# Patient Record
Sex: FEMALE | Race: WHITE | Employment: FULL TIME | URBAN - NONMETROPOLITAN AREA
[De-identification: names, ages, dates, MRNs, and addresses within clinical notes are randomized per-mention and may not be internally consistent; named-entity substitution may affect disease eponyms.]

---

## 2017-04-06 DIAGNOSIS — J02.9 PHARYNGITIS, UNSPECIFIED ETIOLOGY: Primary | ICD-10-CM

## 2017-04-06 RX ORDER — AZITHROMYCIN 250 MG/1
TABLET, FILM COATED ORAL
Qty: 1 PACKET | Refills: 0 | Status: SHIPPED | OUTPATIENT
Start: 2017-04-06 | End: 2017-05-09 | Stop reason: ALTCHOICE

## 2017-05-09 ENCOUNTER — OFFICE VISIT (OUTPATIENT)
Dept: FAMILY MEDICINE CLINIC | Age: 32
End: 2017-05-09

## 2017-05-09 VITALS
SYSTOLIC BLOOD PRESSURE: 102 MMHG | HEART RATE: 80 BPM | TEMPERATURE: 98.4 F | DIASTOLIC BLOOD PRESSURE: 68 MMHG | WEIGHT: 160 LBS | OXYGEN SATURATION: 99 % | HEIGHT: 63 IN | BODY MASS INDEX: 28.35 KG/M2

## 2017-05-09 DIAGNOSIS — Z00.00 ENCOUNTER FOR PREVENTIVE HEALTH EXAMINATION: Primary | ICD-10-CM

## 2017-05-09 DIAGNOSIS — R53.83 OTHER FATIGUE: ICD-10-CM

## 2017-05-09 PROCEDURE — 99395 PREV VISIT EST AGE 18-39: CPT | Performed by: NURSE PRACTITIONER

## 2017-05-09 ASSESSMENT — ENCOUNTER SYMPTOMS
SHORTNESS OF BREATH: 0
COUGH: 0

## 2017-05-23 DIAGNOSIS — R53.83 OTHER FATIGUE: ICD-10-CM

## 2017-05-23 DIAGNOSIS — Z00.00 ENCOUNTER FOR PREVENTIVE HEALTH EXAMINATION: ICD-10-CM

## 2017-05-23 LAB
CHOLESTEROL, TOTAL: 215 MG/DL (ref 0–199)
GLUCOSE BLD-MCNC: 79 MG/DL (ref 74–109)
HDLC SERPL-MCNC: 58 MG/DL (ref 40–59)
LDL CHOLESTEROL CALCULATED: 143 MG/DL (ref 0–129)
TRIGL SERPL-MCNC: 70 MG/DL (ref 0–200)
TSH SERPL DL<=0.05 MIU/L-ACNC: 1.63 UIU/ML (ref 0.27–4.2)

## 2018-03-19 ENCOUNTER — TELEPHONE (OUTPATIENT)
Dept: FAMILY MEDICINE CLINIC | Age: 33
End: 2018-03-19

## 2018-03-19 DIAGNOSIS — J01.90 ACUTE SINUSITIS, RECURRENCE NOT SPECIFIED, UNSPECIFIED LOCATION: Primary | ICD-10-CM

## 2018-03-19 RX ORDER — AZITHROMYCIN 250 MG/1
TABLET, FILM COATED ORAL
Qty: 1 PACKET | Refills: 0 | Status: SHIPPED | OUTPATIENT
Start: 2018-03-19 | End: 2018-05-07 | Stop reason: ALTCHOICE

## 2018-05-07 ENCOUNTER — OFFICE VISIT (OUTPATIENT)
Dept: FAMILY MEDICINE CLINIC | Age: 33
End: 2018-05-07
Payer: COMMERCIAL

## 2018-05-07 VITALS
TEMPERATURE: 96.8 F | BODY MASS INDEX: 29.85 KG/M2 | SYSTOLIC BLOOD PRESSURE: 104 MMHG | OXYGEN SATURATION: 99 % | DIASTOLIC BLOOD PRESSURE: 72 MMHG | HEART RATE: 97 BPM | WEIGHT: 162.2 LBS | HEIGHT: 62 IN

## 2018-05-07 DIAGNOSIS — Z00.00 ANNUAL PHYSICAL EXAM: Primary | ICD-10-CM

## 2018-05-07 PROCEDURE — 99395 PREV VISIT EST AGE 18-39: CPT | Performed by: NURSE PRACTITIONER

## 2018-05-07 ASSESSMENT — PATIENT HEALTH QUESTIONNAIRE - PHQ9
SUM OF ALL RESPONSES TO PHQ9 QUESTIONS 1 & 2: 0
SUM OF ALL RESPONSES TO PHQ QUESTIONS 1-9: 0
1. LITTLE INTEREST OR PLEASURE IN DOING THINGS: 0
2. FEELING DOWN, DEPRESSED OR HOPELESS: 0

## 2018-05-07 ASSESSMENT — ENCOUNTER SYMPTOMS
SHORTNESS OF BREATH: 0
COUGH: 0

## 2018-05-17 DIAGNOSIS — Z00.00 ANNUAL PHYSICAL EXAM: ICD-10-CM

## 2018-05-17 LAB
CHOLESTEROL, TOTAL: 232 MG/DL (ref 0–199)
GLUCOSE BLD-MCNC: 80 MG/DL (ref 74–109)
HDLC SERPL-MCNC: 57 MG/DL (ref 40–59)
LDL CHOLESTEROL CALCULATED: 164 MG/DL (ref 0–129)
TRIGL SERPL-MCNC: 57 MG/DL (ref 0–200)

## 2018-05-21 ENCOUNTER — OFFICE VISIT (OUTPATIENT)
Dept: FAMILY MEDICINE CLINIC | Age: 33
End: 2018-05-21
Payer: COMMERCIAL

## 2018-05-21 VITALS
WEIGHT: 161.4 LBS | OXYGEN SATURATION: 99 % | BODY MASS INDEX: 29.7 KG/M2 | DIASTOLIC BLOOD PRESSURE: 68 MMHG | HEART RATE: 77 BPM | HEIGHT: 62 IN | SYSTOLIC BLOOD PRESSURE: 94 MMHG

## 2018-05-21 DIAGNOSIS — E78.5 HYPERLIPIDEMIA, UNSPECIFIED HYPERLIPIDEMIA TYPE: ICD-10-CM

## 2018-05-21 DIAGNOSIS — E66.3 OVERWEIGHT (BMI 25.0-29.9): Primary | ICD-10-CM

## 2018-05-21 PROCEDURE — 99213 OFFICE O/P EST LOW 20 MIN: CPT | Performed by: NURSE PRACTITIONER

## 2018-05-21 RX ORDER — PHENTERMINE HYDROCHLORIDE 37.5 MG/1
37.5 TABLET ORAL
Qty: 30 TABLET | Refills: 0 | Status: SHIPPED | OUTPATIENT
Start: 2018-05-21 | End: 2018-06-14 | Stop reason: SDUPTHER

## 2018-05-21 ASSESSMENT — ENCOUNTER SYMPTOMS
SHORTNESS OF BREATH: 0
COUGH: 0

## 2018-06-14 ENCOUNTER — OFFICE VISIT (OUTPATIENT)
Dept: FAMILY MEDICINE CLINIC | Age: 33
End: 2018-06-14
Payer: COMMERCIAL

## 2018-06-14 VITALS
TEMPERATURE: 97.5 F | DIASTOLIC BLOOD PRESSURE: 68 MMHG | WEIGHT: 152.2 LBS | OXYGEN SATURATION: 99 % | BODY MASS INDEX: 28.01 KG/M2 | SYSTOLIC BLOOD PRESSURE: 96 MMHG | HEART RATE: 87 BPM | HEIGHT: 62 IN

## 2018-06-14 DIAGNOSIS — E78.5 HYPERLIPIDEMIA, UNSPECIFIED HYPERLIPIDEMIA TYPE: Primary | ICD-10-CM

## 2018-06-14 DIAGNOSIS — E66.3 OVERWEIGHT (BMI 25.0-29.9): ICD-10-CM

## 2018-06-14 PROCEDURE — 99213 OFFICE O/P EST LOW 20 MIN: CPT | Performed by: NURSE PRACTITIONER

## 2018-06-14 RX ORDER — PHENTERMINE HYDROCHLORIDE 37.5 MG/1
37.5 TABLET ORAL
Qty: 30 TABLET | Refills: 0 | Status: SHIPPED | OUTPATIENT
Start: 2018-06-14 | End: 2018-07-12 | Stop reason: SDUPTHER

## 2018-06-14 ASSESSMENT — ENCOUNTER SYMPTOMS
SHORTNESS OF BREATH: 0
COUGH: 0

## 2018-07-12 ENCOUNTER — OFFICE VISIT (OUTPATIENT)
Dept: FAMILY MEDICINE CLINIC | Age: 33
End: 2018-07-12
Payer: COMMERCIAL

## 2018-07-12 VITALS
WEIGHT: 148 LBS | SYSTOLIC BLOOD PRESSURE: 98 MMHG | BODY MASS INDEX: 27.23 KG/M2 | DIASTOLIC BLOOD PRESSURE: 66 MMHG | HEIGHT: 62 IN | OXYGEN SATURATION: 99 % | HEART RATE: 87 BPM

## 2018-07-12 DIAGNOSIS — E78.5 HYPERLIPIDEMIA, UNSPECIFIED HYPERLIPIDEMIA TYPE: Primary | ICD-10-CM

## 2018-07-12 DIAGNOSIS — E66.3 OVERWEIGHT (BMI 25.0-29.9): ICD-10-CM

## 2018-07-12 PROCEDURE — 99213 OFFICE O/P EST LOW 20 MIN: CPT | Performed by: NURSE PRACTITIONER

## 2018-07-12 RX ORDER — PHENTERMINE HYDROCHLORIDE 37.5 MG/1
37.5 TABLET ORAL
Qty: 30 TABLET | Refills: 0 | Status: SHIPPED | OUTPATIENT
Start: 2018-07-12 | End: 2019-06-03 | Stop reason: SDUPTHER

## 2018-07-12 ASSESSMENT — ENCOUNTER SYMPTOMS
SHORTNESS OF BREATH: 0
COUGH: 0

## 2018-07-12 NOTE — PROGRESS NOTES
Subjective  Chief Complaint   Patient presents with    Check-Up     refill adipex       HPI    Pt here to follow up on weight loss with adipex. Has lost 13 pounds total since starting adipex 2 months ago. Doing well with adipex. No side effects other than some waking at night but not bothersome. Blood pressure is well controlled. Patient Active Problem List    Diagnosis Date Noted    Hyperlipidemia 05/21/2018    Overweight (BMI 25.0-29.9) 05/21/2018     History reviewed. No pertinent past medical history. History reviewed. No pertinent surgical history. Family History   Problem Relation Age of Onset    Cancer Father         gleoblastoma    Thyroid Disease Mother     Elevated Lipids Mother     Hypertension Father     Elevated Lipids Father     Diabetes Maternal Grandmother     Diabetes Maternal Grandfather     Cancer Paternal Grandfather         lymphoma    COPD Paternal Grandmother     Hypertension Paternal Grandmother     Elevated Lipids Paternal Grandmother      Social History     Social History    Marital status:      Spouse name: N/A    Number of children: N/A    Years of education: N/A     Social History Main Topics    Smoking status: Never Smoker    Smokeless tobacco: Never Used    Alcohol use 0.0 oz/week      Comment: socially    Drug use: No    Sexual activity: Yes     Partners: Male     Other Topics Concern    None     Social History Narrative    None     Current Outpatient Prescriptions on File Prior to Visit   Medication Sig Dispense Refill    levonorgestrel (MIRENA) 20 MCG/24HR IUD 1 each by Intrauterine route once       No current facility-administered medications on file prior to visit. Allergies   Allergen Reactions    Epiduo [Adapalene-Benzoyl Peroxide] Swelling    Pcn [Penicillins]        Review of Systems   Constitutional: Negative for chills, diaphoresis, fatigue and fever. Respiratory: Negative for cough and shortness of breath.     Cardiovascular:

## 2018-07-25 RX ORDER — METHYLPREDNISOLONE 4 MG/1
TABLET ORAL
Qty: 21 TABLET | Refills: 0 | Status: SHIPPED | OUTPATIENT
Start: 2018-07-25 | End: 2018-07-31

## 2018-08-13 ENCOUNTER — OFFICE VISIT (OUTPATIENT)
Dept: FAMILY MEDICINE CLINIC | Age: 33
End: 2018-08-13
Payer: COMMERCIAL

## 2018-08-13 VITALS
WEIGHT: 143 LBS | OXYGEN SATURATION: 99 % | BODY MASS INDEX: 26.31 KG/M2 | HEIGHT: 62 IN | DIASTOLIC BLOOD PRESSURE: 68 MMHG | SYSTOLIC BLOOD PRESSURE: 96 MMHG | HEART RATE: 99 BPM

## 2018-08-13 DIAGNOSIS — E66.3 OVERWEIGHT (BMI 25.0-29.9): Primary | ICD-10-CM

## 2018-08-13 DIAGNOSIS — E78.5 HYPERLIPIDEMIA, UNSPECIFIED HYPERLIPIDEMIA TYPE: ICD-10-CM

## 2018-08-13 PROCEDURE — 99213 OFFICE O/P EST LOW 20 MIN: CPT | Performed by: NURSE PRACTITIONER

## 2018-08-13 ASSESSMENT — ENCOUNTER SYMPTOMS
SHORTNESS OF BREATH: 0
COUGH: 0

## 2018-08-13 NOTE — PROGRESS NOTES
headaches. Psychiatric/Behavioral: Negative for sleep disturbance. Objective  Vitals:    08/13/18 0732   BP: 96/68   Pulse: 99   SpO2: 99%   Weight: 143 lb (64.9 kg)   Height: 5' 2\" (1.575 m)     Physical Exam   Constitutional: She is oriented to person, place, and time. She appears well-developed and well-nourished. No distress. HENT:   Head: Normocephalic and atraumatic. Cardiovascular: Normal rate, regular rhythm and normal heart sounds. Pulmonary/Chest: Effort normal and breath sounds normal. No respiratory distress. Neurological: She is alert and oriented to person, place, and time. No cranial nerve deficit. Skin: Skin is warm and dry. No rash noted. She is not diaphoretic. No erythema. No pallor. Psychiatric: She has a normal mood and affect. Her behavior is normal. Judgment and thought content normal.     Wt Readings from Last 3 Encounters:   08/13/18 143 lb (64.9 kg)   07/12/18 148 lb (67.1 kg)   06/14/18 152 lb 3.2 oz (69 kg)         Assessment & Plan     Diagnosis Orders   1. Overweight (BMI 25.0-29.9)     2. Hyperlipidemia, unspecified hyperlipidemia type       Continue and finish adipex. Continue with healthy nutrition and exercise. F/u PRN. Side effects, adverse effects of the medication prescribed today, as well as treatment plan/ rationale and result expectations have been discussed with the patient who expresses understanding and desires to proceed. Close follow up to evaluate treatment results and for coordination of care. I have reviewed the patient's medical history in detail and updated the computerized patient record. As always, patient is advised that if symptoms worsen in any way they will proceed to the nearest emergency room. No orders of the defined types were placed in this encounter. No orders of the defined types were placed in this encounter. Return if symptoms worsen or fail to improve.     Luca Payan, BILLIE - CNP

## 2018-09-24 ENCOUNTER — NURSE ONLY (OUTPATIENT)
Dept: FAMILY MEDICINE CLINIC | Age: 33
End: 2018-09-24
Payer: COMMERCIAL

## 2018-09-24 PROCEDURE — 90471 IMMUNIZATION ADMIN: CPT | Performed by: NURSE PRACTITIONER

## 2018-09-24 PROCEDURE — 90688 IIV4 VACCINE SPLT 0.5 ML IM: CPT | Performed by: NURSE PRACTITIONER

## 2018-09-24 NOTE — PROGRESS NOTES
Vaccine Information Sheet, \"Influenza - Inactivated\"  given to Modesta Bone, or parent/legal guardian of  Modesta Bone and verbalized understanding. Patient responses:    Have you ever had a reaction to a flu vaccine? No  Are you able to eat eggs without adverse effects? Yes  Do you have any current illness? No  Have you ever had Guillian Oden Syndrome? No    Flu vaccine given per order. Please see immunization tab. After obtaining consent, and per orders of Dr. Rogelio Murphy, injection of Flu given in Left deltoid by Ynes Arroyo. Patient instructed to remain in clinic for 20 minutes afterwards, and to report any adverse reaction to me immediately.

## 2018-11-24 ENCOUNTER — TELEPHONE (OUTPATIENT)
Dept: FAMILY MEDICINE CLINIC | Age: 33
End: 2018-11-24

## 2018-11-24 RX ORDER — CEFDINIR 300 MG/1
300 CAPSULE ORAL 2 TIMES DAILY
Qty: 20 CAPSULE | Refills: 0 | Status: SHIPPED | OUTPATIENT
Start: 2018-11-24 | End: 2018-12-04

## 2018-12-04 ENCOUNTER — TELEPHONE (OUTPATIENT)
Dept: FAMILY MEDICINE CLINIC | Age: 33
End: 2018-12-04

## 2018-12-04 DIAGNOSIS — R05.9 COUGH: Primary | ICD-10-CM

## 2018-12-04 RX ORDER — AZITHROMYCIN 250 MG/1
250 TABLET, FILM COATED ORAL SEE ADMIN INSTRUCTIONS
Qty: 6 TABLET | Refills: 0 | Status: SHIPPED | OUTPATIENT
Start: 2018-12-04 | End: 2018-12-09

## 2019-02-28 DIAGNOSIS — R68.89 FLU-LIKE SYMPTOMS: Primary | ICD-10-CM

## 2019-02-28 RX ORDER — OSELTAMIVIR PHOSPHATE 75 MG/1
75 CAPSULE ORAL 2 TIMES DAILY
Qty: 10 CAPSULE | Refills: 0 | Status: SHIPPED | OUTPATIENT
Start: 2019-02-28 | End: 2019-03-05

## 2019-03-14 DIAGNOSIS — J40 SINOBRONCHITIS: Primary | ICD-10-CM

## 2019-03-14 DIAGNOSIS — J32.9 SINOBRONCHITIS: Primary | ICD-10-CM

## 2019-03-14 RX ORDER — METHYLPREDNISOLONE 4 MG/1
TABLET ORAL
Qty: 1 KIT | Refills: 0 | Status: SHIPPED | OUTPATIENT
Start: 2019-03-14 | End: 2019-03-20

## 2019-06-03 ENCOUNTER — OFFICE VISIT (OUTPATIENT)
Dept: FAMILY MEDICINE CLINIC | Age: 34
End: 2019-06-03
Payer: COMMERCIAL

## 2019-06-03 VITALS
OXYGEN SATURATION: 97 % | SYSTOLIC BLOOD PRESSURE: 110 MMHG | BODY MASS INDEX: 27.6 KG/M2 | WEIGHT: 150 LBS | DIASTOLIC BLOOD PRESSURE: 72 MMHG | HEIGHT: 62 IN | HEART RATE: 87 BPM

## 2019-06-03 DIAGNOSIS — F41.9 ANXIETY: ICD-10-CM

## 2019-06-03 DIAGNOSIS — E66.3 OVERWEIGHT (BMI 25.0-29.9): ICD-10-CM

## 2019-06-03 DIAGNOSIS — E78.5 HYPERLIPIDEMIA, UNSPECIFIED HYPERLIPIDEMIA TYPE: Primary | ICD-10-CM

## 2019-06-03 PROCEDURE — 99213 OFFICE O/P EST LOW 20 MIN: CPT | Performed by: NURSE PRACTITIONER

## 2019-06-03 RX ORDER — PHENTERMINE HYDROCHLORIDE 37.5 MG/1
37.5 TABLET ORAL
Qty: 30 TABLET | Refills: 0 | Status: SHIPPED | OUTPATIENT
Start: 2019-06-03 | End: 2019-07-03

## 2019-06-03 ASSESSMENT — ENCOUNTER SYMPTOMS
COUGH: 0
SHORTNESS OF BREATH: 0

## 2019-06-03 ASSESSMENT — PATIENT HEALTH QUESTIONNAIRE - PHQ9
1. LITTLE INTEREST OR PLEASURE IN DOING THINGS: 0
SUM OF ALL RESPONSES TO PHQ QUESTIONS 1-9: 0
2. FEELING DOWN, DEPRESSED OR HOPELESS: 0
SUM OF ALL RESPONSES TO PHQ9 QUESTIONS 1 & 2: 0
SUM OF ALL RESPONSES TO PHQ QUESTIONS 1-9: 0

## 2019-07-09 ENCOUNTER — OFFICE VISIT (OUTPATIENT)
Dept: FAMILY MEDICINE CLINIC | Age: 34
End: 2019-07-09
Payer: COMMERCIAL

## 2019-07-09 VITALS
DIASTOLIC BLOOD PRESSURE: 70 MMHG | HEART RATE: 75 BPM | SYSTOLIC BLOOD PRESSURE: 110 MMHG | WEIGHT: 140 LBS | OXYGEN SATURATION: 98 % | BODY MASS INDEX: 25.76 KG/M2 | HEIGHT: 62 IN | TEMPERATURE: 97.3 F

## 2019-07-09 DIAGNOSIS — E78.5 HYPERLIPIDEMIA, UNSPECIFIED HYPERLIPIDEMIA TYPE: Primary | ICD-10-CM

## 2019-07-09 DIAGNOSIS — E66.3 OVERWEIGHT (BMI 25.0-29.9): ICD-10-CM

## 2019-07-09 PROCEDURE — 99213 OFFICE O/P EST LOW 20 MIN: CPT | Performed by: NURSE PRACTITIONER

## 2019-07-09 ASSESSMENT — ENCOUNTER SYMPTOMS
SHORTNESS OF BREATH: 0
COUGH: 0

## 2019-07-09 NOTE — PROGRESS NOTES
Subjective  Chief Complaint   Patient presents with    1 Month Follow-Up     weight loss       HPI    Pt here for follow up on overweight. Was started on adipex and has lost 10 pounds in the first month. BMI is now down to 25.61. Pt is doing very well. No other issues or concerns. Patient Active Problem List    Diagnosis Date Noted    Hyperlipidemia 05/21/2018    Overweight (BMI 25.0-29.9) 05/21/2018     No past medical history on file. No past surgical history on file. Family History   Problem Relation Age of Onset    Cancer Father         gleoblastoma    Thyroid Disease Mother     Elevated Lipids Mother     Hypertension Father     Elevated Lipids Father     Diabetes Maternal Grandmother     Diabetes Maternal Grandfather     Cancer Paternal Grandfather         lymphoma    COPD Paternal Grandmother     Hypertension Paternal Grandmother     Elevated Lipids Paternal Grandmother      Social History     Socioeconomic History    Marital status:      Spouse name: None    Number of children: None    Years of education: None    Highest education level: None   Occupational History    None   Social Needs    Financial resource strain: None    Food insecurity:     Worry: None     Inability: None    Transportation needs:     Medical: None     Non-medical: None   Tobacco Use    Smoking status: Never Smoker    Smokeless tobacco: Never Used   Substance and Sexual Activity    Alcohol use:  Yes     Alcohol/week: 0.0 oz     Comment: socially    Drug use: No    Sexual activity: Yes     Partners: Male   Lifestyle    Physical activity:     Days per week: None     Minutes per session: None    Stress: None   Relationships    Social connections:     Talks on phone: None     Gets together: None     Attends Oriental orthodox service: None     Active member of club or organization: None     Attends meetings of clubs or organizations: None     Relationship status: None    Intimate partner violence:     Fear Plan     Diagnosis Orders   1. Hyperlipidemia, unspecified hyperlipidemia type     2. Overweight (BMI 25.0-29. 9)       No refills of adipex d/t weight loss with first month. Continue with healthy diet and exercise. F/u annually and PRN. Side effects, adverse effects of the medication prescribed today, as well as treatment plan/ rationale and result expectations have been discussed with the patient who expresses understanding and desires to proceed. Close follow up to evaluate treatment results and for coordination of care. I have reviewed the patient's medical history in detail and updated the computerized patient record. As always, patient is advised that if symptoms worsen in any way they will proceed to the nearest emergency room. No orders of the defined types were placed in this encounter. No orders of the defined types were placed in this encounter. Return if symptoms worsen or fail to improve.     Eliot Kendrick, APRN - CNP

## 2019-08-08 ENCOUNTER — TELEPHONE (OUTPATIENT)
Dept: OBGYN CLINIC | Age: 34
End: 2019-08-08

## 2019-08-08 NOTE — TELEPHONE ENCOUNTER
Patient is a [facility] requesting if Dr. Piotr Rm will make an exception for her to have an annual on a Friday, please advise

## 2019-08-26 DIAGNOSIS — J01.90 ACUTE SINUSITIS, RECURRENCE NOT SPECIFIED, UNSPECIFIED LOCATION: Primary | ICD-10-CM

## 2019-08-26 RX ORDER — CEFDINIR 300 MG/1
300 CAPSULE ORAL 2 TIMES DAILY
Qty: 20 CAPSULE | Refills: 0 | Status: SHIPPED | OUTPATIENT
Start: 2019-08-26 | End: 2019-09-05

## 2019-09-06 ENCOUNTER — OFFICE VISIT (OUTPATIENT)
Dept: OBGYN CLINIC | Age: 34
End: 2019-09-06
Payer: COMMERCIAL

## 2019-09-06 VITALS
WEIGHT: 141 LBS | DIASTOLIC BLOOD PRESSURE: 72 MMHG | HEIGHT: 62 IN | SYSTOLIC BLOOD PRESSURE: 110 MMHG | BODY MASS INDEX: 25.95 KG/M2

## 2019-09-06 DIAGNOSIS — Z01.419 WOMEN'S ANNUAL ROUTINE GYNECOLOGICAL EXAMINATION: ICD-10-CM

## 2019-09-06 DIAGNOSIS — Z01.419 WOMEN'S ANNUAL ROUTINE GYNECOLOGICAL EXAMINATION: Primary | ICD-10-CM

## 2019-09-06 DIAGNOSIS — Z11.51 SCREENING FOR HPV (HUMAN PAPILLOMAVIRUS): ICD-10-CM

## 2019-09-06 PROCEDURE — 99395 PREV VISIT EST AGE 18-39: CPT | Performed by: OBSTETRICS & GYNECOLOGY

## 2019-09-12 LAB
HPV COMMENT: NORMAL
HPV TYPE 16: NOT DETECTED
HPV TYPE 18: NOT DETECTED
HPVOH (OTHER TYPES): NOT DETECTED

## 2019-11-22 ENCOUNTER — PROCEDURE VISIT (OUTPATIENT)
Dept: OBGYN CLINIC | Age: 34
End: 2019-11-22
Payer: COMMERCIAL

## 2019-11-22 VITALS
HEIGHT: 62 IN | DIASTOLIC BLOOD PRESSURE: 70 MMHG | SYSTOLIC BLOOD PRESSURE: 100 MMHG | WEIGHT: 141 LBS | BODY MASS INDEX: 25.95 KG/M2

## 2019-11-22 DIAGNOSIS — N92.1 MENOMETRORRHAGIA: ICD-10-CM

## 2019-11-22 DIAGNOSIS — Z30.433 ENCOUNTER FOR IUD REMOVAL AND REINSERTION: Primary | ICD-10-CM

## 2019-11-22 PROCEDURE — 58300 INSERT INTRAUTERINE DEVICE: CPT | Performed by: OBSTETRICS & GYNECOLOGY

## 2019-11-22 PROCEDURE — 58301 REMOVE INTRAUTERINE DEVICE: CPT | Performed by: OBSTETRICS & GYNECOLOGY

## 2020-03-16 RX ORDER — AZITHROMYCIN 250 MG/1
250 TABLET, FILM COATED ORAL DAILY
Qty: 6 TABLET | Refills: 0 | Status: SHIPPED | OUTPATIENT
Start: 2020-03-16 | End: 2020-03-21

## 2020-03-27 RX ORDER — ALBUTEROL SULFATE 90 UG/1
2 AEROSOL, METERED RESPIRATORY (INHALATION) 4 TIMES DAILY PRN
Qty: 1 INHALER | Refills: 0 | Status: SHIPPED | OUTPATIENT
Start: 2020-03-27 | End: 2021-09-07 | Stop reason: ALTCHOICE

## 2020-03-27 RX ORDER — AZITHROMYCIN 250 MG/1
TABLET, FILM COATED ORAL
Qty: 1 PACKET | Refills: 0 | Status: SHIPPED | OUTPATIENT
Start: 2020-03-27 | End: 2020-04-06

## 2020-07-11 RX ORDER — CIPROFLOXACIN 500 MG/1
500 TABLET, FILM COATED ORAL 2 TIMES DAILY
Qty: 20 TABLET | Refills: 0 | Status: SHIPPED | OUTPATIENT
Start: 2020-07-11 | End: 2020-07-21

## 2020-09-15 ENCOUNTER — OFFICE VISIT (OUTPATIENT)
Dept: FAMILY MEDICINE CLINIC | Age: 35
End: 2020-09-15
Payer: COMMERCIAL

## 2020-09-15 VITALS
OXYGEN SATURATION: 98 % | HEART RATE: 60 BPM | TEMPERATURE: 97.2 F | HEIGHT: 62 IN | DIASTOLIC BLOOD PRESSURE: 76 MMHG | WEIGHT: 148 LBS | BODY MASS INDEX: 27.23 KG/M2 | SYSTOLIC BLOOD PRESSURE: 104 MMHG

## 2020-09-15 PROCEDURE — 99395 PREV VISIT EST AGE 18-39: CPT | Performed by: NURSE PRACTITIONER

## 2020-09-15 SDOH — ECONOMIC STABILITY: FOOD INSECURITY: WITHIN THE PAST 12 MONTHS, YOU WORRIED THAT YOUR FOOD WOULD RUN OUT BEFORE YOU GOT MONEY TO BUY MORE.: NEVER TRUE

## 2020-09-15 SDOH — ECONOMIC STABILITY: INCOME INSECURITY: HOW HARD IS IT FOR YOU TO PAY FOR THE VERY BASICS LIKE FOOD, HOUSING, MEDICAL CARE, AND HEATING?: NOT HARD AT ALL

## 2020-09-15 SDOH — ECONOMIC STABILITY: TRANSPORTATION INSECURITY
IN THE PAST 12 MONTHS, HAS THE LACK OF TRANSPORTATION KEPT YOU FROM MEDICAL APPOINTMENTS OR FROM GETTING MEDICATIONS?: NO

## 2020-09-15 SDOH — ECONOMIC STABILITY: FOOD INSECURITY: WITHIN THE PAST 12 MONTHS, THE FOOD YOU BOUGHT JUST DIDN'T LAST AND YOU DIDN'T HAVE MONEY TO GET MORE.: NEVER TRUE

## 2020-09-15 SDOH — ECONOMIC STABILITY: TRANSPORTATION INSECURITY
IN THE PAST 12 MONTHS, HAS LACK OF TRANSPORTATION KEPT YOU FROM MEETINGS, WORK, OR FROM GETTING THINGS NEEDED FOR DAILY LIVING?: NO

## 2020-09-15 ASSESSMENT — ENCOUNTER SYMPTOMS
CHEST TIGHTNESS: 0
BACK PAIN: 0
COLOR CHANGE: 0
EYE PAIN: 0
COUGH: 0
SHORTNESS OF BREATH: 0
ABDOMINAL PAIN: 0
DIARRHEA: 0
CONSTIPATION: 0
TROUBLE SWALLOWING: 0

## 2020-09-15 ASSESSMENT — PATIENT HEALTH QUESTIONNAIRE - PHQ9
SUM OF ALL RESPONSES TO PHQ QUESTIONS 1-9: 0
2. FEELING DOWN, DEPRESSED OR HOPELESS: 0
SUM OF ALL RESPONSES TO PHQ QUESTIONS 1-9: 0
SUM OF ALL RESPONSES TO PHQ9 QUESTIONS 1 & 2: 0
1. LITTLE INTEREST OR PLEASURE IN DOING THINGS: 0

## 2020-09-15 NOTE — PROGRESS NOTES
None     Physically abused: None     Forced sexual activity: None   Other Topics Concern    None   Social History Narrative    None     Current Outpatient Medications on File Prior to Visit   Medication Sig Dispense Refill    albuterol sulfate HFA (VENTOLIN HFA) 108 (90 Base) MCG/ACT inhaler Inhale 2 puffs into the lungs 4 times daily as needed for Wheezing 1 Inhaler 0     Current Facility-Administered Medications on File Prior to Visit   Medication Dose Route Frequency Provider Last Rate Last Dose    levonorgestrel (MIRENA) IUD 52 mg 1 each  1 each Intrauterine Once Nikki Hughes MD   1 each at 11/22/19 1015     Allergies   Allergen Reactions    Epiduo [Adapalene-Benzoyl Peroxide] Swelling    Pcn [Penicillins]        Review of Systems   Constitutional: Negative for activity change, appetite change, chills, diaphoresis, fatigue and fever. HENT: Negative for congestion, ear pain, hearing loss and trouble swallowing. Eyes: Negative for pain and visual disturbance. Respiratory: Negative for cough, chest tightness and shortness of breath. Cardiovascular: Negative for chest pain, palpitations and leg swelling. Gastrointestinal: Negative for abdominal pain, constipation and diarrhea. Endocrine: Negative for polydipsia, polyphagia and polyuria. Genitourinary: Negative for difficulty urinating and dysuria. Musculoskeletal: Negative for arthralgias and back pain. Skin: Negative for color change and rash. Neurological: Negative for dizziness and light-headedness. Psychiatric/Behavioral: Negative for dysphoric mood. The patient is not nervous/anxious. Objective  Vitals:    09/15/20 1144   BP: 104/76   Site: Right Upper Arm   Position: Sitting   Cuff Size: Medium Adult   Pulse: 60   Temp: 97.2 °F (36.2 °C)   TempSrc: Infrared   SpO2: 98%   Weight: 148 lb (67.1 kg)   Height: 5' 2\" (1.575 m)     Physical Exam  Vitals signs reviewed.    Constitutional:       General: She is not in acute Affect: Mood normal.         Speech: Speech normal.         Behavior: Behavior normal.         Thought Content: Thought content normal.         Judgment: Judgment normal.         Assessment& Plan     Diagnosis Orders   1. Encounter for preventive health examination  Glucose   2. Lipid screening  Lipid Panel     Check labs as ordered. F/u annually and PRN. Orders Placed This Encounter   Procedures    Glucose     Standing Status:   Future     Standing Expiration Date:   9/15/2021    Lipid Panel     Standing Status:   Future     Standing Expiration Date:   9/15/2021     Order Specific Question:   Is Patient Fasting?/# of Hours     Answer:   y/12       No orders of the defined types were placed in this encounter. Return in about 1 year (around 9/15/2021), or if symptoms worsen or fail to improve.     Cathy Fischer, APRN - CNP

## 2020-09-16 DIAGNOSIS — Z13.220 LIPID SCREENING: ICD-10-CM

## 2020-09-16 DIAGNOSIS — Z00.00 ENCOUNTER FOR PREVENTIVE HEALTH EXAMINATION: ICD-10-CM

## 2020-09-16 LAB
CHOLESTEROL, TOTAL: 208 MG/DL (ref 0–199)
GLUCOSE BLD-MCNC: 69 MG/DL (ref 70–99)
HDLC SERPL-MCNC: 57 MG/DL (ref 40–59)
LDL CHOLESTEROL CALCULATED: 142 MG/DL (ref 0–129)
TRIGL SERPL-MCNC: 46 MG/DL (ref 0–150)

## 2021-09-07 ENCOUNTER — OFFICE VISIT (OUTPATIENT)
Dept: FAMILY MEDICINE CLINIC | Age: 36
End: 2021-09-07
Payer: COMMERCIAL

## 2021-09-07 VITALS
WEIGHT: 131 LBS | TEMPERATURE: 98.8 F | HEIGHT: 62 IN | SYSTOLIC BLOOD PRESSURE: 104 MMHG | HEART RATE: 65 BPM | BODY MASS INDEX: 24.11 KG/M2 | DIASTOLIC BLOOD PRESSURE: 68 MMHG | OXYGEN SATURATION: 99 %

## 2021-09-07 DIAGNOSIS — Z00.00 ENCOUNTER FOR PREVENTIVE HEALTH EXAMINATION: Primary | ICD-10-CM

## 2021-09-07 DIAGNOSIS — R53.83 FATIGUE, UNSPECIFIED TYPE: ICD-10-CM

## 2021-09-07 DIAGNOSIS — Z13.220 LIPID SCREENING: ICD-10-CM

## 2021-09-07 PROCEDURE — 99395 PREV VISIT EST AGE 18-39: CPT | Performed by: NURSE PRACTITIONER

## 2021-09-07 ASSESSMENT — PATIENT HEALTH QUESTIONNAIRE - PHQ9
1. LITTLE INTEREST OR PLEASURE IN DOING THINGS: 0
SUM OF ALL RESPONSES TO PHQ9 QUESTIONS 1 & 2: 0
SUM OF ALL RESPONSES TO PHQ QUESTIONS 1-9: 0
SUM OF ALL RESPONSES TO PHQ QUESTIONS 1-9: 0
2. FEELING DOWN, DEPRESSED OR HOPELESS: 0
SUM OF ALL RESPONSES TO PHQ QUESTIONS 1-9: 0

## 2021-09-07 ASSESSMENT — ENCOUNTER SYMPTOMS
CHEST TIGHTNESS: 0
EYE PAIN: 0
COLOR CHANGE: 0
COUGH: 0
DIARRHEA: 0
BACK PAIN: 0
SHORTNESS OF BREATH: 0
TROUBLE SWALLOWING: 0
ABDOMINAL PAIN: 0
CONSTIPATION: 0

## 2021-09-07 NOTE — PROGRESS NOTES
Stress :    Social Connections:     Frequency of Communication with Friends and Family:     Frequency of Social Gatherings with Friends and Family:     Attends [employer] Services:     Active Member of Clubs or Organizations:     Attends Club or Organization Meetings:     Marital Status:    Intimate Partner Violence:     Fear of Current or Ex-Partner:     Emotionally Abused:     Physically Abused:     Sexually Abused:      No current outpatient medications on file prior to visit. Current Facility-Administered Medications on File Prior to Visit   Medication Dose Route Frequency Provider Last Rate Last Admin    levonorgestrel (MIRENA) IUD 52 mg 1 each  1 each IntraUTERine Once Eliza Donis MD   1 each at 11/22/19 1015     Allergies   Allergen Reactions    Epiduo [Adapalene-Benzoyl Peroxide] Swelling    Pcn [Penicillins]        Review of Systems   Constitutional: Positive for fatigue. Negative for activity change, appetite change, chills, diaphoresis and fever. HENT: Negative for congestion, ear pain, hearing loss and trouble swallowing. Eyes: Negative for pain and visual disturbance. Respiratory: Negative for cough, chest tightness and shortness of breath. Cardiovascular: Negative for chest pain, palpitations and leg swelling. Gastrointestinal: Negative for abdominal pain, constipation and diarrhea. Endocrine: Negative for polydipsia, polyphagia and polyuria. Genitourinary: Negative for difficulty urinating and dysuria. Musculoskeletal: Negative for arthralgias and back pain. Skin: Negative for color change and rash. Neurological: Negative for dizziness and light-headedness. Psychiatric/Behavioral: Negative for dysphoric mood. The patient is not nervous/anxious.         Objective  Vitals:    09/07/21 1200   BP: 104/68   Site: Left Upper Arm   Position: Sitting   Cuff Size: Medium Adult   Pulse: 65   Temp: 98.8 °F (37.1 °C)   SpO2: 99%   Weight: 131 lb (59.4 kg)   Height: 5' 2\" (1.575 m)     Physical Exam  Vitals reviewed. Constitutional:       General: She is not in acute distress. Appearance: Normal appearance. She is well-developed and normal weight. She is not ill-appearing, toxic-appearing or diaphoretic. HENT:      Head: Normocephalic and atraumatic. Right Ear: Hearing, tympanic membrane, ear canal and external ear normal. There is no impacted cerumen. Left Ear: Hearing, tympanic membrane, ear canal and external ear normal. There is no impacted cerumen. Nose: Nose normal. No congestion or rhinorrhea. Mouth/Throat:      Mouth: Mucous membranes are moist.      Pharynx: Oropharynx is clear. No oropharyngeal exudate or posterior oropharyngeal erythema. Eyes:      General:         Right eye: No discharge. Left eye: No discharge. Conjunctiva/sclera: Conjunctivae normal.      Pupils: Pupils are equal, round, and reactive to light. Neck:      Thyroid: No thyromegaly. Cardiovascular:      Rate and Rhythm: Normal rate and regular rhythm. Pulses: Normal pulses. Heart sounds: Normal heart sounds. No murmur heard. Pulmonary:      Effort: Pulmonary effort is normal. No respiratory distress. Breath sounds: Normal breath sounds. No stridor. No wheezing, rhonchi or rales. Chest:      Chest wall: No tenderness. Musculoskeletal:         General: Normal range of motion. Cervical back: Neck supple. No tenderness. Right lower leg: No edema. Left lower leg: No edema. Lymphadenopathy:      Cervical: No cervical adenopathy. Skin:     General: Skin is warm and dry. Capillary Refill: Capillary refill takes less than 2 seconds. Coloration: Skin is not jaundiced or pale. Findings: No bruising, erythema, lesion or rash. Neurological:      General: No focal deficit present. Mental Status: She is alert and oriented to person, place, and time. Mental status is at baseline.       Cranial Nerves: No cranial nerve deficit. Coordination: Coordination normal.      Gait: Gait normal.   Psychiatric:         Mood and Affect: Mood normal.         Speech: Speech normal.         Behavior: Behavior normal.         Thought Content: Thought content normal.         Judgment: Judgment normal.         Assessment& Plan     Diagnosis Orders   1. Encounter for preventive health examination  Comprehensive Metabolic Panel    CBC With Auto Differential   2. Lipid screening  Lipid Panel   3. Fatigue, unspecified type  TSH with Reflex     Doing well. Check labs. F/u annually and PRN. Side effects, adverse effects of the medication prescribed today, as well as treatment plan/ rationale and result expectations have been discussed with the patient who expresses understanding and desires to proceed. Close follow up to evaluate treatment results and for coordination of care. I have reviewed the patient's medical history in detail and updated the computerized patient record. As always, patient is advised that if symptoms worsen in any way they will proceed to the nearest emergency room. Orders Placed This Encounter   Procedures    Comprehensive Metabolic Panel     Standing Status:   Future     Standing Expiration Date:   9/7/2022    CBC With Auto Differential     Standing Status:   Future     Standing Expiration Date:   9/7/2022    TSH with Reflex     Standing Status:   Future     Standing Expiration Date:   9/7/2022    Lipid Panel     Standing Status:   Future     Standing Expiration Date:   9/7/2022     Order Specific Question:   Is Patient Fasting?/# of Hours     Answer:   y     Comments:   12       No orders of the defined types were placed in this encounter.       Medications Discontinued During This Encounter   Medication Reason    albuterol sulfate HFA (VENTOLIN HFA) 108 (90 Base) MCG/ACT inhaler Therapy completed       Return in about 1 year (around 9/7/2022) for annual exam.    BILLIE Syed - CNP

## 2021-11-03 RX ORDER — CIPROFLOXACIN 500 MG/1
500 TABLET, FILM COATED ORAL 2 TIMES DAILY
Qty: 20 TABLET | Refills: 0 | Status: SHIPPED | OUTPATIENT
Start: 2021-11-03 | End: 2021-11-13

## 2022-02-16 RX ORDER — METHYLPREDNISOLONE 4 MG/1
TABLET ORAL
Qty: 21 TABLET | Refills: 0 | Status: SHIPPED | OUTPATIENT
Start: 2022-02-16 | End: 2022-02-22

## 2022-07-20 ENCOUNTER — TELEPHONE (OUTPATIENT)
Dept: FAMILY MEDICINE CLINIC | Age: 37
End: 2022-07-20

## 2022-07-20 DIAGNOSIS — L03.039 PARONYCHIA OF SECOND TOE: Primary | ICD-10-CM

## 2022-07-20 RX ORDER — CEPHALEXIN 500 MG/1
500 CAPSULE ORAL 3 TIMES DAILY
Qty: 30 CAPSULE | Refills: 0 | Status: SHIPPED | OUTPATIENT
Start: 2022-07-20 | End: 2022-07-30

## 2022-07-21 ENCOUNTER — NURSE ONLY (OUTPATIENT)
Dept: FAMILY MEDICINE CLINIC | Age: 37
End: 2022-07-21

## 2022-07-21 DIAGNOSIS — Z20.822 COVID-19 RULED OUT: Primary | ICD-10-CM

## 2022-07-21 LAB
Lab: NORMAL
PERFORMING INSTRUMENT: NORMAL
QC PASS/FAIL: NORMAL
SARS-COV-2, POC: NORMAL

## 2022-07-21 PROCEDURE — 87426 SARSCOV CORONAVIRUS AG IA: CPT | Performed by: FAMILY MEDICINE

## 2022-07-21 SDOH — ECONOMIC STABILITY: FOOD INSECURITY: WITHIN THE PAST 12 MONTHS, THE FOOD YOU BOUGHT JUST DIDN'T LAST AND YOU DIDN'T HAVE MONEY TO GET MORE.: NEVER TRUE

## 2022-07-21 SDOH — ECONOMIC STABILITY: FOOD INSECURITY: WITHIN THE PAST 12 MONTHS, YOU WORRIED THAT YOUR FOOD WOULD RUN OUT BEFORE YOU GOT MONEY TO BUY MORE.: NEVER TRUE

## 2022-07-21 ASSESSMENT — PATIENT HEALTH QUESTIONNAIRE - PHQ9
SUM OF ALL RESPONSES TO PHQ QUESTIONS 1-9: 0
SUM OF ALL RESPONSES TO PHQ QUESTIONS 1-9: 0
2. FEELING DOWN, DEPRESSED OR HOPELESS: 0
1. LITTLE INTEREST OR PLEASURE IN DOING THINGS: 0
SUM OF ALL RESPONSES TO PHQ9 QUESTIONS 1 & 2: 0
SUM OF ALL RESPONSES TO PHQ QUESTIONS 1-9: 0
SUM OF ALL RESPONSES TO PHQ QUESTIONS 1-9: 0

## 2022-07-21 ASSESSMENT — SOCIAL DETERMINANTS OF HEALTH (SDOH): HOW HARD IS IT FOR YOU TO PAY FOR THE VERY BASICS LIKE FOOD, HOUSING, MEDICAL CARE, AND HEATING?: NOT HARD AT ALL

## 2022-09-15 ENCOUNTER — OFFICE VISIT (OUTPATIENT)
Dept: FAMILY MEDICINE CLINIC | Age: 37
End: 2022-09-15
Payer: COMMERCIAL

## 2022-09-15 VITALS
DIASTOLIC BLOOD PRESSURE: 60 MMHG | BODY MASS INDEX: 24.66 KG/M2 | TEMPERATURE: 97.7 F | OXYGEN SATURATION: 98 % | HEIGHT: 62 IN | WEIGHT: 134 LBS | SYSTOLIC BLOOD PRESSURE: 110 MMHG | HEART RATE: 85 BPM

## 2022-09-15 DIAGNOSIS — Z13.220 LIPID SCREENING: ICD-10-CM

## 2022-09-15 DIAGNOSIS — Z00.00 ENCOUNTER FOR PREVENTIVE HEALTH EXAMINATION: Primary | ICD-10-CM

## 2022-09-15 PROCEDURE — 99395 PREV VISIT EST AGE 18-39: CPT | Performed by: NURSE PRACTITIONER

## 2022-09-15 ASSESSMENT — ENCOUNTER SYMPTOMS
TROUBLE SWALLOWING: 0
ABDOMINAL PAIN: 0
COLOR CHANGE: 0
CHEST TIGHTNESS: 0
COUGH: 0
DIARRHEA: 0
BACK PAIN: 0
SHORTNESS OF BREATH: 0
EYE PAIN: 0
CONSTIPATION: 0

## 2022-09-15 NOTE — PROGRESS NOTES
Subjective  Chief Complaint   Patient presents with    Employment Physical     BE WELL        HPI    Pt here for physical.    Doing well. No issues or concerns today. Due for labs. Eating healthy and exercising, maintaining healthy weight. Current on pap through [employer] [facility] [employer]. No past medical history on file. Patient Active Problem List    Diagnosis Date Noted    Hyperlipidemia 05/21/2018    Overweight (BMI 25.0-29.9) 05/21/2018     No past surgical history on file. Family History   Problem Relation Age of Onset    Cancer Father         gleoblastoma    Hypertension Father     Elevated Lipids Father     Thyroid Disease Mother     Elevated Lipids Mother     Diabetes Maternal Grandmother     Diabetes Maternal Grandfather     Cancer Paternal Grandfather         lymphoma    COPD Paternal Grandmother     Hypertension Paternal Grandmother     Elevated Lipids Paternal Grandmother      Social History     Socioeconomic History    Marital status:      Spouse name: None    Number of children: None    Years of education: None    Highest education level: None   Tobacco Use    Smoking status: Never    Smokeless tobacco: Never   Substance and Sexual Activity    Alcohol use: Yes     Alcohol/week: 0.0 standard drinks     Comment: socially    Drug use: No    Sexual activity: Yes     Partners: Male     Social Determinants of Health     Financial Resource Strain: Low Risk     Difficulty of Paying Living Expenses: Not hard at all   Food Insecurity: No Food Insecurity    Worried About [address] in the Last Year: Never true    Ran Out of Food in the Last Year: Never true   Transportation Needs: No Transportation Needs    Lack of Transportation (Medical): No    Lack of Transportation (Non-Medical): No     No current outpatient medications on file prior to visit.      Current Facility-Administered Medications on File Prior to Visit   Medication Dose Route Frequency Provider Last Rate Last Admin levonorgestrel (MIRENA) IUD 52 mg 1 each  1 each IntraUTERine Once Aj Handy MD   1 each at 11/22/19 1015     Allergies   Allergen Reactions    Pcn [Penicillins]        Review of Systems   Constitutional:  Negative for activity change, appetite change, chills, diaphoresis, fatigue and fever. HENT:  Negative for congestion, ear pain, hearing loss and trouble swallowing. Eyes:  Negative for pain and visual disturbance. Respiratory:  Negative for cough, chest tightness and shortness of breath. Cardiovascular:  Negative for chest pain, palpitations and leg swelling. Gastrointestinal:  Negative for abdominal pain, constipation and diarrhea. Endocrine: Negative for polydipsia, polyphagia and polyuria. Genitourinary:  Negative for difficulty urinating and dysuria. Musculoskeletal:  Negative for arthralgias and back pain. Skin:  Negative for color change and rash. Neurological:  Negative for dizziness and light-headedness. Psychiatric/Behavioral:  Negative for dysphoric mood. The patient is not nervous/anxious. Objective  Vitals:    09/15/22 0734   BP: 110/60   Pulse: 85   Temp: 97.7 °F (36.5 °C)   SpO2: 98%   Weight: 134 lb (60.8 kg)   Height: 5' 2\" (1.575 m)     Physical Exam  Vitals reviewed. Constitutional:       General: She is not in acute distress. Appearance: Normal appearance. She is well-developed and normal weight. She is not ill-appearing, toxic-appearing or diaphoretic. HENT:      Head: Normocephalic and atraumatic. Right Ear: Hearing, tympanic membrane, ear canal and external ear normal. There is no impacted cerumen. Left Ear: Hearing, tympanic membrane, ear canal and external ear normal. There is no impacted cerumen. Nose: Nose normal. No congestion or rhinorrhea. Mouth/Throat:      Mouth: Mucous membranes are moist.      Pharynx: Oropharynx is clear. No oropharyngeal exudate or posterior oropharyngeal erythema.    Eyes:      General: Right eye: No discharge. Left eye: No discharge. Conjunctiva/sclera: Conjunctivae normal.      Pupils: Pupils are equal, round, and reactive to light. Neck:      Thyroid: No thyromegaly. Cardiovascular:      Rate and Rhythm: Normal rate and regular rhythm. Pulses: Normal pulses. Heart sounds: Normal heart sounds. No murmur heard. Pulmonary:      Effort: Pulmonary effort is normal. No respiratory distress. Breath sounds: Normal breath sounds. No stridor. No wheezing, rhonchi or rales. Chest:      Chest wall: No tenderness. Musculoskeletal:         General: Normal range of motion. Cervical back: Neck supple. No tenderness. Right lower leg: No edema. Left lower leg: No edema. Lymphadenopathy:      Cervical: No cervical adenopathy. Skin:     General: Skin is warm and dry. Capillary Refill: Capillary refill takes less than 2 seconds. Coloration: Skin is not jaundiced or pale. Findings: No bruising, erythema, lesion or rash. Neurological:      General: No focal deficit present. Mental Status: She is alert and oriented to person, place, and time. Mental status is at baseline. Cranial Nerves: No cranial nerve deficit. Coordination: Coordination normal.      Gait: Gait normal.   Psychiatric:         Mood and Affect: Mood normal.         Speech: Speech normal.         Behavior: Behavior normal.         Thought Content: Thought content normal.         Judgment: Judgment normal.       Assessment& Plan     Diagnosis Orders   1. Encounter for preventive health examination  Glucose, Fasting      2. Lipid screening  Lipid Panel        Check labs as ordered. Side effects, adverse effects of the medication prescribed today, as well as treatment plan/ rationale and result expectations have been discussed with the patient who expresses understanding and desires to proceed.     Close follow up to evaluate treatment results and for coordination of care.  I have reviewed the patient's medical history in detail and updated the computerized patient record. As always, patient is advised that if symptoms worsen in any way they will proceed to the nearest emergency room. Orders Placed This Encounter   Procedures    Lipid Panel     Standing Status:   Future     Standing Expiration Date:   9/15/2023     Order Specific Question:   Is Patient Fasting?/# of Hours     Answer:   10    Glucose, Fasting     Standing Status:   Future     Standing Expiration Date:   9/15/2023       No orders of the defined types were placed in this encounter. There are no discontinued medications.     Return in about 1 year (around 9/15/2023) for annual exam.    Colleen Pabon, BILLIE - CNP

## 2022-09-23 DIAGNOSIS — Z00.00 ENCOUNTER FOR PREVENTIVE HEALTH EXAMINATION: ICD-10-CM

## 2022-09-23 DIAGNOSIS — Z13.220 LIPID SCREENING: ICD-10-CM

## 2022-09-23 LAB
CHOLESTEROL, TOTAL: 208 MG/DL (ref 0–199)
GLUCOSE FASTING: 77 MG/DL (ref 70–99)
HDLC SERPL-MCNC: 61 MG/DL (ref 40–59)
LDL CHOLESTEROL CALCULATED: 135 MG/DL (ref 0–129)
TRIGL SERPL-MCNC: 60 MG/DL (ref 0–150)

## 2022-12-20 RX ORDER — METHYLPREDNISOLONE 4 MG/1
TABLET ORAL
Qty: 1 KIT | Refills: 0 | Status: SHIPPED | OUTPATIENT
Start: 2022-12-20

## 2022-12-20 NOTE — PROGRESS NOTES
Residual Wheezing/congestion after acute URI has cleared up    Requested medrol larry     Orders Placed This Encounter   Medications    methylPREDNISolone (MEDROL DOSEPACK) 4 MG tablet     Sig: Take by mouth. Dispense:  1 kit     Refill:  0       The above med(s) were e-scripted to the patient's pharmacy.      Keon Sevilla MD

## 2023-01-19 RX ORDER — ONDANSETRON 4 MG/1
4 TABLET, FILM COATED ORAL 3 TIMES DAILY PRN
Qty: 15 TABLET | Refills: 0 | Status: SHIPPED | OUTPATIENT
Start: 2023-01-19

## 2023-01-19 RX ORDER — CIPROFLOXACIN 500 MG/1
500 TABLET, FILM COATED ORAL 2 TIMES DAILY
Qty: 20 TABLET | Refills: 0 | Status: SHIPPED | OUTPATIENT
Start: 2023-01-19 | End: 2023-01-29

## 2023-07-11 DIAGNOSIS — N76.0 BV (BACTERIAL VAGINOSIS): Primary | ICD-10-CM

## 2023-07-11 DIAGNOSIS — B96.89 BV (BACTERIAL VAGINOSIS): Primary | ICD-10-CM

## 2023-07-11 RX ORDER — CLINDAMYCIN HYDROCHLORIDE 300 MG/1
300 CAPSULE ORAL 2 TIMES DAILY
Qty: 14 CAPSULE | Refills: 0 | Status: SHIPPED | OUTPATIENT
Start: 2023-07-11 | End: 2023-07-18

## 2023-08-15 ASSESSMENT — PATIENT HEALTH QUESTIONNAIRE - PHQ9
SUM OF ALL RESPONSES TO PHQ9 QUESTIONS 1 & 2: 0
1. LITTLE INTEREST OR PLEASURE IN DOING THINGS: NOT AT ALL
2. FEELING DOWN, DEPRESSED OR HOPELESS: NOT AT ALL
SUM OF ALL RESPONSES TO PHQ QUESTIONS 1-9: 0
1. LITTLE INTEREST OR PLEASURE IN DOING THINGS: 0
SUM OF ALL RESPONSES TO PHQ QUESTIONS 1-9: 0
SUM OF ALL RESPONSES TO PHQ9 QUESTIONS 1 & 2: 0
SUM OF ALL RESPONSES TO PHQ QUESTIONS 1-9: 0
SUM OF ALL RESPONSES TO PHQ QUESTIONS 1-9: 0
2. FEELING DOWN, DEPRESSED OR HOPELESS: 0

## 2023-08-18 ENCOUNTER — OFFICE VISIT (OUTPATIENT)
Dept: FAMILY MEDICINE CLINIC | Age: 38
End: 2023-08-18
Payer: COMMERCIAL

## 2023-08-18 VITALS
WEIGHT: 136 LBS | BODY MASS INDEX: 25.03 KG/M2 | DIASTOLIC BLOOD PRESSURE: 72 MMHG | HEIGHT: 62 IN | SYSTOLIC BLOOD PRESSURE: 112 MMHG | HEART RATE: 71 BPM | OXYGEN SATURATION: 98 %

## 2023-08-18 DIAGNOSIS — Z13.220 LIPID SCREENING: ICD-10-CM

## 2023-08-18 DIAGNOSIS — Z00.00 ENCOUNTER FOR PREVENTIVE HEALTH EXAMINATION: Primary | ICD-10-CM

## 2023-08-18 PROCEDURE — 99395 PREV VISIT EST AGE 18-39: CPT | Performed by: NURSE PRACTITIONER

## 2023-08-18 SDOH — ECONOMIC STABILITY: HOUSING INSECURITY
IN THE LAST 12 MONTHS, WAS THERE A TIME WHEN YOU DID NOT HAVE A STEADY PLACE TO SLEEP OR SLEPT IN A SHELTER (INCLUDING NOW)?: NO

## 2023-08-18 SDOH — ECONOMIC STABILITY: FOOD INSECURITY: WITHIN THE PAST 12 MONTHS, THE FOOD YOU BOUGHT JUST DIDN'T LAST AND YOU DIDN'T HAVE MONEY TO GET MORE.: NEVER TRUE

## 2023-08-18 SDOH — ECONOMIC STABILITY: FOOD INSECURITY: WITHIN THE PAST 12 MONTHS, YOU WORRIED THAT YOUR FOOD WOULD RUN OUT BEFORE YOU GOT MONEY TO BUY MORE.: NEVER TRUE

## 2023-08-18 SDOH — ECONOMIC STABILITY: INCOME INSECURITY: HOW HARD IS IT FOR YOU TO PAY FOR THE VERY BASICS LIKE FOOD, HOUSING, MEDICAL CARE, AND HEATING?: NOT HARD AT ALL

## 2023-08-18 ASSESSMENT — ENCOUNTER SYMPTOMS
COUGH: 0
DIARRHEA: 0
EYE PAIN: 0
COLOR CHANGE: 0
BACK PAIN: 0
TROUBLE SWALLOWING: 0
SHORTNESS OF BREATH: 0
ABDOMINAL PAIN: 0
CONSTIPATION: 0
CHEST TIGHTNESS: 0

## 2023-08-18 NOTE — PROGRESS NOTES
methylPREDNISolone (MEDROL DOSEPACK) 4 MG tablet Take by mouth. 1 kit 0     Current Facility-Administered Medications on File Prior to Visit   Medication Dose Route Frequency Provider Last Rate Last Admin    levonorgestrel (MIRENA) IUD 52 mg 1 each  1 each IntraUTERine Once Vandana Rhodes MD   1 each at 11/22/19 1015     Allergies   Allergen Reactions    Pcn [Penicillins]        Review of Systems   Constitutional:  Negative for activity change, appetite change, chills, diaphoresis, fatigue and fever. HENT:  Negative for congestion, ear pain, hearing loss and trouble swallowing. Eyes:  Negative for pain and visual disturbance. Respiratory:  Negative for cough, chest tightness and shortness of breath. Cardiovascular:  Negative for chest pain, palpitations and leg swelling. Gastrointestinal:  Negative for abdominal pain, constipation and diarrhea. Endocrine: Negative for polydipsia, polyphagia and polyuria. Genitourinary:  Negative for difficulty urinating and dysuria. Musculoskeletal:  Negative for arthralgias and back pain. Skin:  Negative for color change and rash. Neurological:  Negative for dizziness and light-headedness. Psychiatric/Behavioral:  Negative for dysphoric mood. The patient is not nervous/anxious. Objective  Vitals:    08/18/23 1112   BP: 112/72   Site: Left Upper Arm   Position: Sitting   Cuff Size: Medium Adult   Pulse: 71   SpO2: 98%   Weight: 136 lb (61.7 kg)   Height: 5' 2\" (1.575 m)     Physical Exam  Vitals reviewed. Constitutional:       General: She is not in acute distress. Appearance: Normal appearance. She is well-developed and normal weight. She is not ill-appearing, toxic-appearing or diaphoretic. HENT:      Head: Normocephalic and atraumatic. Right Ear: Hearing, tympanic membrane, ear canal and external ear normal. There is no impacted cerumen.       Left Ear: Hearing, tympanic membrane, ear canal and external ear normal. There is no impacted

## 2023-09-15 DIAGNOSIS — Z00.00 ENCOUNTER FOR PREVENTIVE HEALTH EXAMINATION: ICD-10-CM

## 2023-09-15 DIAGNOSIS — Z13.220 LIPID SCREENING: ICD-10-CM

## 2023-09-15 LAB
ALBUMIN SERPL-MCNC: 4.5 G/DL (ref 3.5–4.6)
ALP SERPL-CCNC: 53 U/L (ref 40–130)
ALT SERPL-CCNC: 10 U/L (ref 0–33)
ANION GAP SERPL CALCULATED.3IONS-SCNC: 13 MEQ/L (ref 9–15)
AST SERPL-CCNC: 21 U/L (ref 0–35)
BILIRUB SERPL-MCNC: 0.3 MG/DL (ref 0.2–0.7)
BUN SERPL-MCNC: 15 MG/DL (ref 6–20)
CALCIUM SERPL-MCNC: 9.1 MG/DL (ref 8.5–9.9)
CHLORIDE SERPL-SCNC: 101 MEQ/L (ref 95–107)
CHOLEST SERPL-MCNC: 205 MG/DL (ref 0–199)
CO2 SERPL-SCNC: 24 MEQ/L (ref 20–31)
CREAT SERPL-MCNC: 0.83 MG/DL (ref 0.5–0.9)
ERYTHROCYTE [DISTWIDTH] IN BLOOD BY AUTOMATED COUNT: 12.8 % (ref 11.5–14.5)
GLOBULIN SER CALC-MCNC: 2.7 G/DL (ref 2.3–3.5)
GLUCOSE SERPL-MCNC: 75 MG/DL (ref 70–99)
HCT VFR BLD AUTO: 42.3 % (ref 37–47)
HDLC SERPL-MCNC: 64 MG/DL (ref 40–59)
HGB BLD-MCNC: 14.6 G/DL (ref 12–16)
LDLC SERPL CALC-MCNC: 127 MG/DL (ref 0–129)
MCH RBC QN AUTO: 30.9 PG (ref 27–31.3)
MCHC RBC AUTO-ENTMCNC: 34.5 % (ref 33–37)
MCV RBC AUTO: 89.5 FL (ref 79.4–94.8)
PLATELET # BLD AUTO: 349 K/UL (ref 130–400)
POTASSIUM SERPL-SCNC: 4.5 MEQ/L (ref 3.4–4.9)
PROT SERPL-MCNC: 7.2 G/DL (ref 6.3–8)
RBC # BLD AUTO: 4.72 M/UL (ref 4.2–5.4)
SODIUM SERPL-SCNC: 138 MEQ/L (ref 135–144)
TRIGL SERPL-MCNC: 72 MG/DL (ref 0–150)
TSH REFLEX: 1.08 UIU/ML (ref 0.44–3.86)
WBC # BLD AUTO: 6.9 K/UL (ref 4.8–10.8)

## 2023-09-16 LAB — VITAMIN D 25-HYDROXY: 27.5 NG/ML (ref 30–100)

## 2024-02-15 ENCOUNTER — OFFICE VISIT (OUTPATIENT)
Dept: FAMILY MEDICINE CLINIC | Age: 39
End: 2024-02-15
Payer: COMMERCIAL

## 2024-02-15 VITALS
OXYGEN SATURATION: 98 % | TEMPERATURE: 98.6 F | HEART RATE: 54 BPM | WEIGHT: 149 LBS | HEIGHT: 62 IN | SYSTOLIC BLOOD PRESSURE: 108 MMHG | DIASTOLIC BLOOD PRESSURE: 70 MMHG | BODY MASS INDEX: 27.42 KG/M2

## 2024-02-15 DIAGNOSIS — E78.5 HYPERLIPIDEMIA, UNSPECIFIED HYPERLIPIDEMIA TYPE: Primary | ICD-10-CM

## 2024-02-15 DIAGNOSIS — E66.3 OVERWEIGHT (BMI 25.0-29.9): ICD-10-CM

## 2024-02-15 PROCEDURE — 99213 OFFICE O/P EST LOW 20 MIN: CPT | Performed by: NURSE PRACTITIONER

## 2024-02-15 RX ORDER — PHENTERMINE HYDROCHLORIDE 37.5 MG/1
37.5 TABLET ORAL
Qty: 30 TABLET | Refills: 0 | Status: SHIPPED | OUTPATIENT
Start: 2024-02-15 | End: 2024-03-16

## 2024-02-15 NOTE — PROGRESS NOTES
Subjective  Chief Complaint   Patient presents with    Personal Problem       HPI    Discuss weight gain.    Has been on adipex previously, done well with it then.    Did restart weight watchers this week. Very active with walking and exercising at least 4 times per week.    Past Medical History:   Diagnosis Date    Hyperlipidemia      Patient Active Problem List    Diagnosis Date Noted    Hyperlipidemia 05/21/2018    Overweight (BMI 25.0-29.9) 05/21/2018     No past surgical history on file.  Family History   Problem Relation Age of Onset    Cancer Father         Glioblastoma    Hypertension Father     Elevated Lipids Father     Thyroid Disease Mother     Elevated Lipids Mother     Diabetes Maternal Grandmother     Diabetes Maternal Grandfather     Cancer Paternal Grandfather         Lymphoma    COPD Paternal Grandmother     Hypertension Paternal Grandmother     Elevated Lipids Paternal Grandmother      Social History     Socioeconomic History    Marital status:      Spouse name: None    Number of children: None    Years of education: None    Highest education level: None   Tobacco Use    Smoking status: Never    Smokeless tobacco: Never   Substance and Sexual Activity    Alcohol use: Yes     Comment: socially    Drug use: No    Sexual activity: Yes     Partners: Male     Social Determinants of Health     Financial Resource Strain: Low Risk  (8/18/2023)    Overall Financial Resource Strain (CARDIA)     Difficulty of Paying Living Expenses: Not hard at all   Transportation Needs: Unknown (8/18/2023)    PRAPARE - Transportation     Lack of Transportation (Non-Medical): No   Housing Stability: Unknown (8/18/2023)    Housing Stability Vital Sign     Unstable Housing in the Last Year: No     No current outpatient medications on file prior to visit.     Current Facility-Administered Medications on File Prior to Visit   Medication Dose Route Frequency Provider Last Rate Last Admin    levonorgestrel (MIRENA) IUD 52

## 2024-02-21 RX ORDER — CEPHALEXIN 500 MG/1
500 CAPSULE ORAL 3 TIMES DAILY
Qty: 21 CAPSULE | Refills: 0 | Status: SHIPPED | OUTPATIENT
Start: 2024-02-21 | End: 2024-02-28

## 2024-03-14 ENCOUNTER — OFFICE VISIT (OUTPATIENT)
Dept: FAMILY MEDICINE CLINIC | Age: 39
End: 2024-03-14
Payer: COMMERCIAL

## 2024-03-14 VITALS
WEIGHT: 142 LBS | HEIGHT: 62 IN | SYSTOLIC BLOOD PRESSURE: 100 MMHG | HEART RATE: 83 BPM | DIASTOLIC BLOOD PRESSURE: 64 MMHG | BODY MASS INDEX: 26.13 KG/M2 | OXYGEN SATURATION: 99 %

## 2024-03-14 DIAGNOSIS — E78.5 HYPERLIPIDEMIA, UNSPECIFIED HYPERLIPIDEMIA TYPE: ICD-10-CM

## 2024-03-14 DIAGNOSIS — E66.3 OVERWEIGHT (BMI 25.0-29.9): ICD-10-CM

## 2024-03-14 PROCEDURE — 99213 OFFICE O/P EST LOW 20 MIN: CPT | Performed by: NURSE PRACTITIONER

## 2024-03-14 RX ORDER — PHENTERMINE HYDROCHLORIDE 37.5 MG/1
37.5 TABLET ORAL
Qty: 30 TABLET | Refills: 0 | Status: SHIPPED | OUTPATIENT
Start: 2024-03-14 | End: 2024-04-13

## 2024-03-14 ASSESSMENT — ENCOUNTER SYMPTOMS
DIARRHEA: 0
CHEST TIGHTNESS: 0
COUGH: 0
COLOR CHANGE: 0
EYE PAIN: 0
CONSTIPATION: 0
ABDOMINAL PAIN: 0
TROUBLE SWALLOWING: 0
BACK PAIN: 0
SHORTNESS OF BREATH: 0

## 2024-03-14 NOTE — PROGRESS NOTES
Subjective  Chief Complaint   Patient presents with    Weight Management     1 month check up       Weight Management  Pertinent negatives include no abdominal pain, arthralgias, chest pain, chills, congestion, coughing, diaphoresis, fatigue, fever or rash.       F/u on weight.    Completed 1 month of adipex.    Lost 7 pounds.    Tolerating medication well without side effects.    Past Medical History:   Diagnosis Date    Hyperlipidemia      Patient Active Problem List    Diagnosis Date Noted    Hyperlipidemia 05/21/2018    Overweight (BMI 25.0-29.9) 05/21/2018     No past surgical history on file.  Family History   Problem Relation Age of Onset    Cancer Father         Glioblastoma    Hypertension Father     Elevated Lipids Father     Thyroid Disease Mother     Elevated Lipids Mother     Diabetes Maternal Grandmother     Diabetes Maternal Grandfather     Cancer Paternal Grandfather         Lymphoma    COPD Paternal Grandmother     Hypertension Paternal Grandmother     Elevated Lipids Paternal Grandmother      Social History     Socioeconomic History    Marital status:      Spouse name: None    Number of children: None    Years of education: None    Highest education level: None   Tobacco Use    Smoking status: Never    Smokeless tobacco: Never   Substance and Sexual Activity    Alcohol use: Yes     Comment: socially    Drug use: No    Sexual activity: Yes     Partners: Male     Social Determinants of Health     Financial Resource Strain: Low Risk  (8/18/2023)    Overall Financial Resource Strain (CARDIA)     Difficulty of Paying Living Expenses: Not hard at all   Transportation Needs: Unknown (8/18/2023)    PRAPARE - Transportation     Lack of Transportation (Non-Medical): No   Housing Stability: Unknown (8/18/2023)    Housing Stability Vital Sign     Unstable Housing in the Last Year: No     No current outpatient medications on file prior to visit.     Current Facility-Administered Medications on File Prior

## 2024-04-17 DIAGNOSIS — E66.3 OVERWEIGHT (BMI 25.0-29.9): ICD-10-CM

## 2024-04-17 DIAGNOSIS — E78.5 HYPERLIPIDEMIA, UNSPECIFIED HYPERLIPIDEMIA TYPE: ICD-10-CM

## 2024-04-17 RX ORDER — PHENTERMINE HYDROCHLORIDE 37.5 MG/1
37.5 TABLET ORAL
Qty: 30 TABLET | Refills: 0 | Status: SHIPPED | OUTPATIENT
Start: 2024-04-17 | End: 2024-04-17 | Stop reason: SDUPTHER

## 2024-04-17 RX ORDER — PHENTERMINE HYDROCHLORIDE 37.5 MG/1
37.5 TABLET ORAL
Qty: 30 TABLET | Refills: 0 | Status: SHIPPED | OUTPATIENT
Start: 2024-04-17 | End: 2024-05-17

## 2024-04-18 ENCOUNTER — OFFICE VISIT (OUTPATIENT)
Dept: FAMILY MEDICINE CLINIC | Age: 39
End: 2024-04-18
Payer: COMMERCIAL

## 2024-04-18 VITALS
OXYGEN SATURATION: 96 % | HEART RATE: 92 BPM | BODY MASS INDEX: 24.77 KG/M2 | WEIGHT: 134.6 LBS | HEIGHT: 62 IN | SYSTOLIC BLOOD PRESSURE: 110 MMHG | DIASTOLIC BLOOD PRESSURE: 60 MMHG

## 2024-04-18 DIAGNOSIS — E66.3 OVERWEIGHT (BMI 25.0-29.9): ICD-10-CM

## 2024-04-18 DIAGNOSIS — E78.5 HYPERLIPIDEMIA, UNSPECIFIED HYPERLIPIDEMIA TYPE: Primary | ICD-10-CM

## 2024-04-18 PROCEDURE — 99213 OFFICE O/P EST LOW 20 MIN: CPT | Performed by: NURSE PRACTITIONER

## 2024-04-18 ASSESSMENT — ENCOUNTER SYMPTOMS
EYE PAIN: 0
BACK PAIN: 0
TROUBLE SWALLOWING: 0
CONSTIPATION: 0
SHORTNESS OF BREATH: 0
COLOR CHANGE: 0
ABDOMINAL PAIN: 0
CHEST TIGHTNESS: 0
DIARRHEA: 0
COUGH: 0

## 2024-04-18 NOTE — PROGRESS NOTES
in detail and updated the computerized patient record.    As always, patient is advised that if symptoms worsen in any way they will proceed to the nearest emergency room.       No orders of the defined types were placed in this encounter.      No orders of the defined types were placed in this encounter.      There are no discontinued medications.      Return in about 1 month (around 5/18/2024) for hyperlipidemia, weight.    Eva Rm, APRN - CNP

## 2024-05-16 ENCOUNTER — OFFICE VISIT (OUTPATIENT)
Dept: FAMILY MEDICINE CLINIC | Age: 39
End: 2024-05-16
Payer: COMMERCIAL

## 2024-05-16 VITALS
DIASTOLIC BLOOD PRESSURE: 66 MMHG | SYSTOLIC BLOOD PRESSURE: 110 MMHG | HEART RATE: 77 BPM | BODY MASS INDEX: 24.29 KG/M2 | OXYGEN SATURATION: 97 % | WEIGHT: 132 LBS | HEIGHT: 62 IN

## 2024-05-16 DIAGNOSIS — E78.5 HYPERLIPIDEMIA, UNSPECIFIED HYPERLIPIDEMIA TYPE: Primary | ICD-10-CM

## 2024-05-16 PROCEDURE — 99213 OFFICE O/P EST LOW 20 MIN: CPT | Performed by: NURSE PRACTITIONER

## 2024-05-16 ASSESSMENT — ENCOUNTER SYMPTOMS
BACK PAIN: 0
CONSTIPATION: 0
EYE PAIN: 0
COUGH: 0
COLOR CHANGE: 0
SHORTNESS OF BREATH: 0
ABDOMINAL PAIN: 0
DIARRHEA: 0

## 2024-05-16 NOTE — PROGRESS NOTES
Subjective  Chief Complaint   Patient presents with    Hyperlipidemia    Weight Management       HPI    1 month f/u on weight.    Lost an additional 2 pounds since last visit.     Has lost 17 pounds total while on adipex.     No other questions or concerns.     Past Medical History:   Diagnosis Date    Hyperlipidemia      Patient Active Problem List    Diagnosis Date Noted    Hyperlipidemia 05/21/2018    Overweight (BMI 25.0-29.9) 05/21/2018     No past surgical history on file.  Family History   Problem Relation Age of Onset    Cancer Father         Glioblastoma    Hypertension Father     Elevated Lipids Father     Thyroid Disease Mother     Elevated Lipids Mother     Diabetes Maternal Grandmother     Diabetes Maternal Grandfather     Cancer Paternal Grandfather         Lymphoma    COPD Paternal Grandmother     Hypertension Paternal Grandmother     Elevated Lipids Paternal Grandmother      Social History     Socioeconomic History    Marital status:    Tobacco Use    Smoking status: Never    Smokeless tobacco: Never   Substance and Sexual Activity    Alcohol use: Yes     Comment: socially    Drug use: No    Sexual activity: Yes     Partners: Male     Social Determinants of Health     Financial Resource Strain: Low Risk  (8/18/2023)    Overall Financial Resource Strain (CARDIA)     Difficulty of Paying Living Expenses: Not hard at all   Transportation Needs: Unknown (8/18/2023)    PRAPARE - Transportation     Lack of Transportation (Non-Medical): No   Housing Stability: Unknown (8/18/2023)    Housing Stability Vital Sign     Unstable Housing in the Last Year: No     Current Outpatient Medications on File Prior to Visit   Medication Sig Dispense Refill    phentermine (ADIPEX-P) 37.5 MG tablet Take 1 tablet by mouth every morning (before breakfast) for 30 days. Max Daily Amount: 37.5 mg 30 tablet 0     Current Facility-Administered Medications on File Prior to Visit   Medication Dose Route Frequency Provider

## 2024-07-02 ENCOUNTER — OFFICE VISIT (OUTPATIENT)
Dept: FAMILY MEDICINE CLINIC | Age: 39
End: 2024-07-02
Payer: COMMERCIAL

## 2024-07-02 VITALS
HEIGHT: 62 IN | SYSTOLIC BLOOD PRESSURE: 110 MMHG | HEART RATE: 70 BPM | WEIGHT: 137 LBS | OXYGEN SATURATION: 100 % | BODY MASS INDEX: 25.21 KG/M2 | DIASTOLIC BLOOD PRESSURE: 60 MMHG

## 2024-07-02 DIAGNOSIS — E78.5 HYPERLIPIDEMIA, UNSPECIFIED HYPERLIPIDEMIA TYPE: Primary | ICD-10-CM

## 2024-07-02 DIAGNOSIS — E66.3 OVERWEIGHT (BMI 25.0-29.9): ICD-10-CM

## 2024-07-02 PROCEDURE — 99213 OFFICE O/P EST LOW 20 MIN: CPT | Performed by: NURSE PRACTITIONER

## 2024-07-02 RX ORDER — PHENTERMINE HYDROCHLORIDE 37.5 MG/1
37.5 TABLET ORAL
Qty: 30 TABLET | Refills: 2 | Status: SHIPPED | OUTPATIENT
Start: 2024-07-02 | End: 2024-09-30

## 2024-07-02 ASSESSMENT — ENCOUNTER SYMPTOMS
CONSTIPATION: 0
SHORTNESS OF BREATH: 0
TROUBLE SWALLOWING: 0
COLOR CHANGE: 0
DIARRHEA: 0
EYE PAIN: 0
CHEST TIGHTNESS: 0
BACK PAIN: 0
ABDOMINAL PAIN: 0
COUGH: 0

## 2024-07-02 NOTE — PROGRESS NOTES
Subjective  Chief Complaint   Patient presents with    Weight Management       HPI    F/u on weight management, HLD.    Was on adipex from February through mid May, lost a total of 17 pounds during that time which was over 5%. Discontinued medication; however she has regained approx 5 pounds since that time. She is continuing with healthy nutrition, physical activity. Would like to restart adipex to help weight loss and maintenance.    Past Medical History:   Diagnosis Date    Hyperlipidemia      Patient Active Problem List    Diagnosis Date Noted    Hyperlipidemia 05/21/2018    Overweight (BMI 25.0-29.9) 05/21/2018     No past surgical history on file.  Family History   Problem Relation Age of Onset    Cancer Father         Glioblastoma    Hypertension Father     Elevated Lipids Father     Thyroid Disease Mother     Elevated Lipids Mother     Diabetes Maternal Grandmother     Diabetes Maternal Grandfather     Cancer Paternal Grandfather         Lymphoma    COPD Paternal Grandmother     Hypertension Paternal Grandmother     Elevated Lipids Paternal Grandmother      Social History     Socioeconomic History    Marital status:    Tobacco Use    Smoking status: Never    Smokeless tobacco: Never   Substance and Sexual Activity    Alcohol use: Yes     Comment: socially    Drug use: No    Sexual activity: Yes     Partners: Male     Social Determinants of Health     Financial Resource Strain: Low Risk  (8/18/2023)    Overall Financial Resource Strain (CARDIA)     Difficulty of Paying Living Expenses: Not hard at all   Transportation Needs: Unknown (8/18/2023)    PRAPARE - Transportation     Lack of Transportation (Non-Medical): No   Housing Stability: Unknown (8/18/2023)    Housing Stability Vital Sign     Unstable Housing in the Last Year: No     No current outpatient medications on file prior to visit.     Current Facility-Administered Medications on File Prior to Visit   Medication Dose Route Frequency Provider

## 2024-07-03 DIAGNOSIS — Z00.00 ENCOUNTER FOR PREVENTIVE HEALTH EXAMINATION: Primary | ICD-10-CM

## 2024-07-03 DIAGNOSIS — Z13.220 LIPID SCREENING: ICD-10-CM

## 2024-08-23 DIAGNOSIS — Z13.220 LIPID SCREENING: ICD-10-CM

## 2024-08-23 DIAGNOSIS — Z00.00 ENCOUNTER FOR PREVENTIVE HEALTH EXAMINATION: ICD-10-CM

## 2024-08-23 LAB
CHOLEST SERPL-MCNC: 202 MG/DL (ref 0–199)
GLUCOSE FASTING: 70 MG/DL (ref 70–99)
HDLC SERPL-MCNC: 65 MG/DL (ref 40–59)
LDLC SERPL CALC-MCNC: 126 MG/DL (ref 0–129)
TRIGL SERPL-MCNC: 54 MG/DL (ref 0–150)

## 2024-11-08 ENCOUNTER — OFFICE VISIT (OUTPATIENT)
Dept: OBGYN CLINIC | Age: 39
End: 2024-11-08
Payer: COMMERCIAL

## 2024-11-08 VITALS
DIASTOLIC BLOOD PRESSURE: 64 MMHG | BODY MASS INDEX: 24.69 KG/M2 | HEART RATE: 124 BPM | SYSTOLIC BLOOD PRESSURE: 106 MMHG | WEIGHT: 135 LBS

## 2024-11-08 DIAGNOSIS — Z12.31 ENCOUNTER FOR SCREENING MAMMOGRAM FOR BREAST CANCER: ICD-10-CM

## 2024-11-08 DIAGNOSIS — Z11.51 SCREENING FOR HUMAN PAPILLOMAVIRUS: ICD-10-CM

## 2024-11-08 DIAGNOSIS — Z01.419 WOMEN'S ANNUAL ROUTINE GYNECOLOGICAL EXAMINATION: Primary | ICD-10-CM

## 2024-11-08 PROCEDURE — 99385 PREV VISIT NEW AGE 18-39: CPT | Performed by: ADVANCED PRACTICE MIDWIFE

## 2024-11-08 ASSESSMENT — ENCOUNTER SYMPTOMS
VOMITING: 0
COUGH: 0
CONSTIPATION: 0
TROUBLE SWALLOWING: 0
SHORTNESS OF BREATH: 0
SORE THROAT: 0
ABDOMINAL PAIN: 0
NAUSEA: 0
DIARRHEA: 0
RHINORRHEA: 0
VOICE CHANGE: 0

## 2024-11-08 NOTE — PROGRESS NOTES
Chief Complaint:     Kaleigh Oleary is a 38 y.o. female who presents here today for:      Chief Complaint   Patient presents with    Annual Exam     Last pap 2019 WNL     History of Present Illness:     Kaleigh Oleary is a 38 y.o. female who presents for her annual exam.      Past Medical History:     Allergies:  Pcn [penicillins]  No LMP recorded. (Menstrual status: IUD).  Obstetrical History:       Past Medical History:   Diagnosis Date    Hyperlipidemia      Medications:     No current outpatient medications on file prior to visit.     Current Facility-Administered Medications on File Prior to Visit   Medication Dose Route Frequency Provider Last Rate Last Admin    levonorgestrel (MIRENA) IUD 52 mg 1 each  1 each IntraUTERine Once Nina Calle MD   1 each at 19 1015     Review of Systems:     Review of Systems   Constitutional:  Negative for activity change, appetite change, chills, diaphoresis, fatigue, fever and unexpected weight change.   HENT:  Negative for congestion, postnasal drip, rhinorrhea, sneezing, sore throat, trouble swallowing and voice change.    Respiratory:  Negative for cough and shortness of breath.    Cardiovascular:  Negative for chest pain.   Gastrointestinal:  Negative for abdominal pain, constipation, diarrhea, nausea and vomiting.   Genitourinary:  Negative for difficulty urinating, dyspareunia, dysuria, frequency, genital sores, menstrual problem, pelvic pain, vaginal bleeding, vaginal discharge and vaginal pain.   Musculoskeletal:  Negative for arthralgias and myalgias.   Neurological:  Negative for dizziness, syncope and headaches.   Hematological:  Negative for adenopathy.   All other systems reviewed and are negative.    Physical Exam:     Vitals:  /64   Pulse (!) 124   Wt 61.2 kg (135 lb)   BMI 24.69 kg/m²     Prior Pap History:  19 - NILM, HPV Negative  6/10/16 - NILM, HPV Negative  3/6/13 - NILM  3/1/12 - NILM, HPV Negative    Physical

## 2024-11-12 LAB
HPV HR 12 DNA SPEC QL NAA+PROBE: NOT DETECTED
HPV16 DNA SPEC QL NAA+PROBE: NOT DETECTED
HPV16+18+H RISK 12 DNA SPEC-IMP: NORMAL
HPV18 DNA SPEC QL NAA+PROBE: NOT DETECTED

## 2025-01-10 ENCOUNTER — HOSPITAL ENCOUNTER (OUTPATIENT)
Dept: WOMENS IMAGING | Age: 40
Discharge: HOME | End: 2025-01-12
Payer: COMMERCIAL

## 2025-01-10 VITALS — HEIGHT: 62 IN | BODY MASS INDEX: 24.69 KG/M2

## 2025-01-10 DIAGNOSIS — Z12.31 ENCOUNTER FOR SCREENING MAMMOGRAM FOR BREAST CANCER: ICD-10-CM

## 2025-01-10 PROCEDURE — 77063 BREAST TOMOSYNTHESIS BI: CPT

## 2025-01-17 ENCOUNTER — TELEPHONE (OUTPATIENT)
Dept: FAMILY MEDICINE CLINIC | Age: 40
End: 2025-01-17

## 2025-01-17 RX ORDER — CIPROFLOXACIN 500 MG/1
500 TABLET, FILM COATED ORAL 2 TIMES DAILY
Qty: 20 TABLET | Refills: 0 | Status: SHIPPED | OUTPATIENT
Start: 2025-01-17 | End: 2025-01-27

## 2025-01-17 RX ORDER — ONDANSETRON 4 MG/1
4 TABLET, FILM COATED ORAL 3 TIMES DAILY PRN
Qty: 15 TABLET | Refills: 0 | Status: SHIPPED | OUTPATIENT
Start: 2025-01-17

## 2025-01-17 NOTE — TELEPHONE ENCOUNTER
Pt requesting cipro and zofran for upcoming trip to [facility] in case she develops UTI or illness while there. Ordered.

## 2025-07-09 ENCOUNTER — OFFICE VISIT (OUTPATIENT)
Dept: FAMILY MEDICINE CLINIC | Age: 40
End: 2025-07-09
Payer: COMMERCIAL

## 2025-07-09 VITALS
WEIGHT: 140 LBS | OXYGEN SATURATION: 98 % | HEART RATE: 83 BPM | BODY MASS INDEX: 25.76 KG/M2 | SYSTOLIC BLOOD PRESSURE: 114 MMHG | HEIGHT: 62 IN | DIASTOLIC BLOOD PRESSURE: 70 MMHG

## 2025-07-09 DIAGNOSIS — Z13.220 LIPID SCREENING: ICD-10-CM

## 2025-07-09 DIAGNOSIS — Z00.00 ENCOUNTER FOR PREVENTIVE HEALTH EXAMINATION: Primary | ICD-10-CM

## 2025-07-09 DIAGNOSIS — Z13.1 DIABETES MELLITUS SCREENING: ICD-10-CM

## 2025-07-09 PROCEDURE — 99396 PREV VISIT EST AGE 40-64: CPT | Performed by: NURSE PRACTITIONER

## 2025-07-09 SDOH — ECONOMIC STABILITY: FOOD INSECURITY: WITHIN THE PAST 12 MONTHS, THE FOOD YOU BOUGHT JUST DIDN'T LAST AND YOU DIDN'T HAVE MONEY TO GET MORE.: NEVER TRUE

## 2025-07-09 SDOH — ECONOMIC STABILITY: FOOD INSECURITY: WITHIN THE PAST 12 MONTHS, YOU WORRIED THAT YOUR FOOD WOULD RUN OUT BEFORE YOU GOT MONEY TO BUY MORE.: NEVER TRUE

## 2025-07-09 ASSESSMENT — PATIENT HEALTH QUESTIONNAIRE - PHQ9
1. LITTLE INTEREST OR PLEASURE IN DOING THINGS: NOT AT ALL
2. FEELING DOWN, DEPRESSED OR HOPELESS: NOT AT ALL
SUM OF ALL RESPONSES TO PHQ QUESTIONS 1-9: 0
SUM OF ALL RESPONSES TO PHQ9 QUESTIONS 1 & 2: 0
1. LITTLE INTEREST OR PLEASURE IN DOING THINGS: NOT AT ALL
2. FEELING DOWN, DEPRESSED OR HOPELESS: NOT AT ALL

## 2025-07-09 ASSESSMENT — ENCOUNTER SYMPTOMS
EYE PAIN: 0
SHORTNESS OF BREATH: 0
COLOR CHANGE: 0
ABDOMINAL PAIN: 0
TROUBLE SWALLOWING: 0
CHEST TIGHTNESS: 0
CONSTIPATION: 0
DIARRHEA: 0
BACK PAIN: 0
COUGH: 0

## 2025-07-09 NOTE — PROGRESS NOTES
Subjective  Chief Complaint   Patient presents with    Annual Exam     Be well       HPI    Pt here for physical.    Doing well.    No issues or concerns today.    Due for labs.    Eating healthy and exercising, maintaining healthy weight.     Current on pap through [facility].    Past Medical History:   Diagnosis Date    Hyperlipidemia      Patient Active Problem List    Diagnosis Date Noted    Hyperlipidemia 05/21/2018    Overweight (BMI 25.0-29.9) 05/21/2018     No past surgical history on file.  Family History   Problem Relation Age of Onset    Thyroid Disease Mother     Elevated Lipids Mother     Cancer Father         Glioblastoma    Hypertension Father     Elevated Lipids Father     Diabetes Maternal Grandmother     Diabetes Maternal Grandfather     COPD Paternal Grandmother     Hypertension Paternal Grandmother     Elevated Lipids Paternal Grandmother     Cancer Paternal Grandfather         Lymphoma    Breast Cancer Neg Hx      Social History     Socioeconomic History    Marital status:      Spouse name: None    Number of children: None    Years of education: None    Highest education level: None   Tobacco Use    Smoking status: Never    Smokeless tobacco: Never   Substance and Sexual Activity    Alcohol use: Yes     Comment: socially    Drug use: No    Sexual activity: Yes     Partners: Male     Social Drivers of Health     Financial Resource Strain: Low Risk  (8/18/2023)    Overall Financial Resource Strain (CARDIA)     Difficulty of Paying Living Expenses: Not hard at all   Food Insecurity: No Food Insecurity (7/9/2025)    Hunger Vital Sign     Worried About Running Out of Food in the Last Year: Never true     Ran Out of Food in the Last Year: Never true   Transportation Needs: No Transportation Needs (7/9/2025)    PRAPARE - Transportation     Lack of Transportation (Medical): No     Lack of Transportation (Non-Medical): No   Housing Stability: Low Risk  (7/9/2025)    Housing Stability Vital

## 2025-07-24 DIAGNOSIS — Z13.9 ENCOUNTER FOR HEALTH-RELATED SCREENING: Primary | ICD-10-CM

## 2025-07-25 LAB
CHOLEST SERPL-MCNC: 209 MG/DL (ref 0–199)
GLUCOSE SERPL-MCNC: 81 MG/DL (ref 70–99)
HDLC SERPL-MCNC: 59 MG/DL (ref 40–59)
LDLC SERPL CALC-MCNC: 136 MG/DL (ref 0–129)
TRIGL SERPL-MCNC: 71 MG/DL (ref 0–150)

## 2025-08-29 ENCOUNTER — OFFICE VISIT (OUTPATIENT)
Dept: FAMILY MEDICINE CLINIC | Age: 40
End: 2025-08-29

## 2025-08-29 VITALS
SYSTOLIC BLOOD PRESSURE: 100 MMHG | WEIGHT: 148 LBS | HEIGHT: 62 IN | DIASTOLIC BLOOD PRESSURE: 76 MMHG | OXYGEN SATURATION: 99 % | BODY MASS INDEX: 27.23 KG/M2

## 2025-08-29 DIAGNOSIS — E66.3 OVERWEIGHT (BMI 25.0-29.9): ICD-10-CM

## 2025-08-29 DIAGNOSIS — E78.5 HYPERLIPIDEMIA, UNSPECIFIED HYPERLIPIDEMIA TYPE: Primary | ICD-10-CM

## 2025-08-29 RX ORDER — TOPIRAMATE 25 MG/1
25 TABLET, FILM COATED ORAL NIGHTLY
Qty: 30 TABLET | Refills: 3 | Status: SHIPPED | OUTPATIENT
Start: 2025-08-29

## 2025-08-29 RX ORDER — PHENTERMINE HYDROCHLORIDE 37.5 MG/1
37.5 TABLET ORAL
Qty: 30 TABLET | Refills: 0 | Status: SHIPPED | OUTPATIENT
Start: 2025-08-29 | End: 2025-09-28

## 2025-08-29 ASSESSMENT — ENCOUNTER SYMPTOMS
BACK PAIN: 0
ABDOMINAL PAIN: 0
CONSTIPATION: 0
TROUBLE SWALLOWING: 0
SHORTNESS OF BREATH: 0
COUGH: 0
EYE PAIN: 0
CHEST TIGHTNESS: 0
COLOR CHANGE: 0
DIARRHEA: 0